# Patient Record
Sex: MALE | Race: AMERICAN INDIAN OR ALASKA NATIVE
[De-identification: names, ages, dates, MRNs, and addresses within clinical notes are randomized per-mention and may not be internally consistent; named-entity substitution may affect disease eponyms.]

---

## 2017-03-14 ENCOUNTER — HOSPITAL ENCOUNTER (EMERGENCY)
Dept: HOSPITAL 43 - DL.ED | Age: 57
LOS: 1 days | Discharge: HOME | End: 2017-03-15
Payer: COMMERCIAL

## 2017-03-14 DIAGNOSIS — R07.81: Primary | ICD-10-CM

## 2017-03-14 PROCEDURE — 71101 X-RAY EXAM UNILAT RIBS/CHEST: CPT

## 2017-03-14 PROCEDURE — 99283 EMERGENCY DEPT VISIT LOW MDM: CPT

## 2017-03-15 VITALS — SYSTOLIC BLOOD PRESSURE: 126 MMHG | DIASTOLIC BLOOD PRESSURE: 83 MMHG

## 2017-03-15 NOTE — EDM.PDOC
ED HPI Trauma





- General


Chief Complaint: Upper Extremity Injury/Pain


Stated Complaint: FELL AND HURT RIBS


Time Seen by Provider: 03/15/17 01:20


Source: Reports: Patient


History Limitations: Reports: No limitations





- History of Present Illness


INITIAL COMMENTS - FREE TEXT/NARRATIVE: 





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 

meals. Has not tried anything for pain, did go to work today, pain worse with 

lifting. No difficulty breathing





Occurred When: other (monday)


Severity: mild


Consciousness: Reports: no loss of consciousness


Associated Symptoms: Reports: no other symptoms


Allergies/ADRs: 


Allergies





No Known Allergies Allergy (Verified 03/14/17 22:42)


 








Home Medications: 


Ambulatory Orders





Phenytoin Sodium Extended [Dilantin] 200 mg PO TID 03/14/17 [Confirmed 03/14/17]











Past Medical History


Neurological History: Reports: Seizure





- Past Surgical History


Musculoskeletal Surgical History: Reports: Other (see below)


Other Musculoskeletal Surgeries/Procedures:: right ankle surgery





Social & Family History





- Family History


Family Medical History: Noncontributory





- Tobacco Use


Smoking Status *Q: Former Smoker


Second Hand Smoke Exposure: No





- Caffeine Use


Caffeine Use: Reports: None





- Recreational Drug Use


Recreational Drug Use: No





Review of Systems





- Review of Systems


Review Of Systems: See Below


Constitutional: Reports: no symptoms


Eyes: Reports: no symptoms


Ears: Reports: no symptoms


Nose: Reports: no symptoms


Mouth/Throat: Reports: no symptoms


Respiratory: Reports: Other (pain left side/ribs, worse with movement).  Denies

: Shortness of Breath, Cough


GI/Abdominal: Reports: No symptoms


Genitourinary: Reports: no symptoms





Trauma Exam





- Physical Exam


Exam: See Below


Exam Limited By: No limitations


General Appearance: Reports: alert, mild distress (increase with movement)


Head: Reports: atraumatic, normocephalic


Eyes: bilateral eye: EOMI


Ears: Reports: normal external exam


Nose: Reports: normal inspection


Throat/Mouth: Reports: Normal inspection


Neck: Reports: non-tender, full range of motion


Respiratory Exam: Reports: no respiratory distress, lungs clear, normal breath 

sounds, splinting, rib tenderness, left (mid lateral), other (no bruising to 

back or ribs).  Denies: rales, rhonchi, wheezing, subcutaneous emphysema


GI/Abdominal: Reports: normal bowel sounds


Back: Reports: full range of motion, normal inspection


Extremities: Reports: no evidence of injury


Neurologic: Reports: alert, normal mood/affect, oriented x 3


Skin: Reports: Normal color, Warm/dry





Course





- Vital Signs


Last Recorded V/S: 


 Last Vital Signs











Temp  98.9 F   03/14/17 22:39


 


Pulse  66   03/15/17 00:56


 


Resp  18   03/15/17 00:56


 


BP  126/83   03/15/17 00:56


 


Pulse Ox  98   03/15/17 00:56














- Orders/Labs/Meds


Orders: 


 Active Orders 24 hr











 Category Date Time Status


 


 Ribs 2V w Chest Rt [CR] Urgent Exams  03/15/17 01:08 Stop Req











Meds: 


Medications














Discontinued Medications














Generic Name Dose Route Start Last Admin





  Trade Name [place]  PRN Reason Stop Dose Admin


 


Ibuprofen  600 mg  03/15/17 01:11  





  Motrin  PO  03/15/17 01:12  





  ONETIME ONE   














Departure





- Departure


Time of Disposition: 01:17


Disposition: Home, Self-Care 01


Condition: fair


Clinical Impression: 


 Rib pain on left side





Instructions:  Rib Contusion


Forms:  ED Department Discharge


Additional Instructions: 


alternate tylenol and ibuprofen for discomfort every 4 hours


splint area with cough


deep breathing every hour while awake


may use heat to area








- My Orders


Last 24 Hours: 


My Active Orders





03/15/17 01:08


Ribs 2V w Chest Rt [CR] Urgent 














- Assessment/Plan


Last 24 Hours: 


My Active Orders





03/15/17 01:08


Ribs 2V w Chest Rt [CR] Urgent

## 2017-03-18 ENCOUNTER — HOSPITAL ENCOUNTER (EMERGENCY)
Dept: HOSPITAL 43 - DL.ED | Age: 57
Discharge: HOME | End: 2017-03-18
Payer: COMMERCIAL

## 2017-03-18 VITALS — DIASTOLIC BLOOD PRESSURE: 80 MMHG | SYSTOLIC BLOOD PRESSURE: 130 MMHG

## 2017-03-18 DIAGNOSIS — Z79.899: ICD-10-CM
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
## 2017-03-18 DIAGNOSIS — S22.42XA: Primary | ICD-10-CM
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
## 2017-03-18 PROCEDURE — 71250 CT THORAX DX C-: CPT

## 2017-03-18 PROCEDURE — 99284 EMERGENCY DEPT VISIT MOD MDM: CPT

## 2017-03-18 NOTE — EDM.PDOC
ED HPI GENERAL MEDICAL PROBLEM





- General


Chief Complaint: General


Stated Complaint: UPPER ABD PAIN, [id number]


Time Seen by Provider: 03/18/17 01:22


Source of Information: Reports: Patient


History Limitations: Reports: No limitations





- History of Present Illness


INITIAL COMMENTS - FREE TEXT/NARRATIVE: 





fell 4 days ago was eval' here with xray.


Treatments PTA: Reports: Acetaminophen


  ** Left Upper Mid-Anterior Chest


Pain Score (Numeric/FACES): 10





- Related Data


 Allergies











Allergy/AdvReac Type Severity Reaction Status Date / Time


 


No Known Allergies Allergy   Verified 03/18/17 01:19











Home Meds: 


 Home Meds





Phenytoin Sodium Extended [Dilantin] 200 mg PO TID 03/14/17 [History]


PHENobarbital [PHENobarbital] 32.4 mg PO TID 03/18/17 [History]











Past Medical History


Neurological History: Reports: Seizure





- Past Surgical History


Musculoskeletal Surgical History: Reports: Other (see below)


Other Musculoskeletal Surgeries/Procedures:: right ankle surgery





Social & Family History





- Family History


Family Medical History: Noncontributory





- Tobacco Use


Smoking Status *Q: Never Smoker


Second Hand Smoke Exposure: No





- Caffeine Use


Caffeine Use: Reports: Soda





- Recreational Drug Use


Recreational Drug Use: No





ED ROS GENERAL





- Review of Systems


Review Of Systems: ROS reveals no pertinent complaints other than HPI.





ED EXAM, GENERAL





- Physical Exam


Exam: See Below


Exam Limited By: No limitations


General Appearance: alert, WD/WN, mild distress, other (pain)


Eye Exam: bilateral eye: PERRL (pupils ess ER @ 4mm)


Ears: hearing grossly normal


Throat/Mouth: Normal voice, No airway compromise


Head: atraumatic


Neck: non-tender, full range of motion


Respiratory/Chest: no respiratory distress, rhonchi, splinting.  No: no 

accessory muscle use, decreased breath sounds, accessory muscle use, retractions


Cardiovascular: regular rate, rhythm


GI/Abdominal: soft, non tender


Neurological: alert, oriented, normal cognition, normal gait, no motor/sensory 

deficits


Psychiatric: flat affect


Skin Exam: Warm, Dry


Lymphatic: no adenopathy





Course





- Vital Signs


Last Recorded V/S: 


 Last Vital Signs











Temp  37.6 C   03/18/17 01:05


 


Pulse  72   03/18/17 01:05


 


Resp  18   03/18/17 01:05


 


BP  130/75   03/18/17 01:05


 


Pulse Ox  92 L  03/18/17 01:05














- Orders/Labs/Meds


Orders: 


 Active Orders 24 hr











 Category Date Time Status


 


 Chest wo Cont [CT] Urgent Exams  03/18/17 01:18 Taken











Meds: 


Medications














Discontinued Medications














Generic Name Dose Route Start Last Admin





  Trade Name Sanchez  PRN Reason Stop Dose Admin


 


Acetaminophen/Hydrocodone Bitart  1 tab  03/18/17 01:23  03/18/17 01:28





  Norco 325-10 Mg  PO  03/18/17 01:24  1 tab





  ONETIME ONE   Administration














- Re-Assessments/Exams


Free Text/Narrative Re-Assessment/Exam: 





03/18/17 03:27


results discussed with pt.





Departure





- Departure


Time of Disposition: 03:27


Disposition: Home, Self-Care 01


Condition: good


Clinical Impression: 


Rib fractures


Qualifiers:


 Encounter type: initial encounter Rib fracture type: multiple ribs Fracture 

type: closed Laterality: left Qualified Code(s): S22.42XA - Multiple fractures 

of ribs, left side, initial encounter for closed fracture





Instructions:  Rib Fracture, Easy-to-Read


Forms:  ED Department Discharge


Additional Instructions: 


1) no bending lifting strain for 2 weeks


2) follow up at clinic or recheck as needed





rx given:


vicodin 5/325mg tid x 12





- My Orders


Last 24 Hours: 


My Active Orders





03/18/17 01:18


Chest wo Cont [CT] Urgent 














- Assessment/Plan


Last 24 Hours: 


My Active Orders





03/18/17 01:18


Chest wo Cont [CT] Urgent

## 2017-03-20 ENCOUNTER — HOSPITAL ENCOUNTER (EMERGENCY)
Dept: HOSPITAL 43 - DL.ED | Age: 57
LOS: 1 days | Discharge: HOME | End: 2017-03-21
Payer: COMMERCIAL

## 2017-03-20 VITALS — SYSTOLIC BLOOD PRESSURE: 122 MMHG | DIASTOLIC BLOOD PRESSURE: 72 MMHG

## 2017-03-20 DIAGNOSIS — K59.01: ICD-10-CM

## 2017-03-20 DIAGNOSIS — Z79.899: ICD-10-CM

## 2017-03-20 DIAGNOSIS — R10.13: Primary | ICD-10-CM

## 2017-03-20 PROCEDURE — 74000: CPT

## 2017-03-20 PROCEDURE — 96375 TX/PRO/DX INJ NEW DRUG ADDON: CPT

## 2017-03-20 PROCEDURE — 99284 EMERGENCY DEPT VISIT MOD MDM: CPT

## 2017-03-20 PROCEDURE — 71010: CPT

## 2017-03-20 PROCEDURE — 93005 ELECTROCARDIOGRAM TRACING: CPT

## 2017-03-20 PROCEDURE — 74177 CT ABD & PELVIS W/CONTRAST: CPT

## 2017-03-20 PROCEDURE — 96374 THER/PROPH/DIAG INJ IV PUSH: CPT

## 2017-03-21 NOTE — EDM.PDOC
ED HPI GI/ABDOMINAL





- General


Chief Complaint: Abdominal Pain


Stated Complaint: STOMACH


Time Seen by Provider: 03/20/17 22:00


Source of Information: Reports: Patient


History Limitations: Reports: No limitations





- History of Present Illness


INITIAL COMMENTS - FREE TEXT/NARRATIVE: 





c/o severe upper abdominal pain after eating doritos tonight. No vomiting. 

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  Was 

evaluated in ED last flash with c/o chest pain. CT done which was unremarkable 

than rib fractures.  Denies problem with constipation. Last BM today. No 

vomiting. 


Timing/Duration: Reports: Hour(s):





- Related Data


Allergies/ADRs: 


 Allergies











Allergy/AdvReac Type Severity Reaction Status Date / Time


 


No Known Allergies Allergy   Verified 03/20/17 22:02











Home Meds: 


 Home Meds





Phenytoin Sodium Extended [Dilantin] 200 mg PO TID 03/14/17 [History]


PHENobarbital [PHENobarbital] 32.4 mg PO TID 03/18/17 [History]


Hydrocodone/Acetaminophen [Vicodin 5-300 mg Tablet] 1 each PO TID PRN 03/20/17 [

History]











Past Medical History


Neurological History: Reports: Seizure





- Past Surgical History


Musculoskeletal Surgical History: Reports: Other (see below)


Other Musculoskeletal Surgeries/Procedures:: right ankle surgery





Social & Family History





- Family History


Family Medical History: Noncontributory





- Tobacco Use


Smoking Status *Q: Unknown Ever Smoked


Second Hand Smoke Exposure: Yes





- Caffeine Use


Caffeine Use: Reports: None





- Recreational Drug Use


Recreational Drug Use: No





ED ROS GENERAL





- Review of Systems


Review Of Systems: See Below


Constitutional: Reports: no symptoms


HEENT: Reports: No symptoms


Respiratory: Reports: Pleuritic Chest Pain


Cardiovascular: Reports: No symptoms


GI/Abdominal: Reports: Abdominal pain, Decreased appetite


: Reports: no symptoms


Musculoskeletal: Reports: no symptoms


Skin: Reports: no symptoms


Neurological: Reports: No Symptoms





ED EXAM, GI/ABD





- Physical Exam


Exam: See Below


Exam Limited By: No limitations


General Appearance: alert, moderate distress


Eyes: bilateral: EOMI


Ears: normal external exam


Nose: normal inspection


Throat/Mouth: Normal inspection, Perioral cyanosis


Head: atraumatic, normocephalic


Neck: normal inspection


Respiratory/Chest: no respiratory distress, lungs clear, other (left lateral 

rib pain with movment)


Cardiovascular: normal peripheral pulses, regular rate, rhythm


GI/Abdominal: hypoactive bowel sounds, tenderness (upper abdomen, mild 

distension pain with palpation).  No: tympanic bowel sounds, guarding


Back Exam: normal inspection


Extremities: normal inspection, normal range of motion


Neurological: alert, oriented, normal cognition


Psychiatric: normal affect, normal mood


Skin Exam: Warm, Dry, Intact, Normal color





Course





- Vital Signs


Last Recorded V/S: 


 Last Vital Signs











Temp  98.2 F   03/20/17 21:50


 


Pulse  66   03/20/17 23:10


 


Resp  20   03/20/17 23:10


 


BP  122/72   03/20/17 23:10


 


Pulse Ox  97   03/20/17 23:10














- Orders/Labs/Meds


Orders: 


 Active Orders 24 hr











 Category Date Time Status


 


 EKG 12 Lead [EKG Documentation Completion] [RC] URGENT Care  03/20/17 22:47 

Active


 


 Abdomen Pelvis w Cont [CT] Urgent Exams  03/21/17 00:20 Taken











Meds: 


Medications














Discontinued Medications














Generic Name Dose Route Start Last Admin





  Trade Name Sanchez  PRN Reason Stop Dose Admin


 


Iopamidol  100 ml  03/21/17 00:19  03/21/17 01:10





  Isovue-300 (61%)  IVPUSH  03/21/17 00:20  100 ml





  ONETIME ONE   Administration


 


Morphine Sulfate  4 mg  03/20/17 22:45  03/20/17 23:04





  Morphine  IVPUSH  03/20/17 22:46  4 mg





  ONETIME ONE   Administration


 


Ondansetron HCl  4 mg  03/20/17 22:42  03/20/17 23:01





  Zofran  IV  03/20/17 22:43  4 mg





  ONETIME ONE   Administration














- Radiology Interpretation


Free Text/Narrative:: 


Upright abdomen, concern for small bowel obstruction, CT abdomen negative, 

Moderate amount of stool throughout abdomen





Departure





- Departure


Time of Disposition: 02:00


Disposition: Home, Self-Care 01


Condition: fair


Clinical Impression: 


Abdominal pain


Qualifiers:


 Abdominal location: epigastric Qualified Code(s): R10.13 - Epigastric pain





Constipation


Qualifiers:


 Constipation type: slow transit constipation Qualified Code(s): K59.01 - Slow 

transit constipation





Instructions:  Constipation, Adult, Easy-to-Read


Referrals: 


PCP,None [Primary Care Provider] - 


Forms:  ED Department Discharge


Additional Instructions: 


miralax one capful daily


avoid use of narcotic pain medication use tylenol or ibuprofen


increase activity


increase fluid intake


no solid food x 12  hours gradual increase diet as tolerated small amounts 

until  having regular stools


follow up as needed











- My Orders


Last 24 Hours: 


My Active Orders





03/20/17 22:47


EKG 12 Lead [EKG Documentation Completion] [RC] URGENT 





03/21/17 00:20


Abdomen Pelvis w Cont [CT] Urgent 














- Assessment/Plan


Last 24 Hours: 


My Active Orders





03/20/17 22:47


EKG 12 Lead [EKG Documentation Completion] [RC] URGENT 





03/21/17 00:20


Abdomen Pelvis w Cont [CT] Urgent

## 2017-04-26 NOTE — EKG
03/20/2017- MIGUEL LORENZO -

 

EKG done on a 56-year-old male showing sinus rhythm with heart rate of 62 beats

per minute.  Normal intervals.  Normal axis.  No acute ST-T wave changes.

 

DD:  04/25/2017 20:29:43

DT:  04/25/2017 22:43:46

[facility]

Job #:  [id number]

## 2018-07-15 ENCOUNTER — HOSPITAL ENCOUNTER (EMERGENCY)
Dept: HOSPITAL 43 - DL.ED | Age: 58
Discharge: HOME | End: 2018-07-15
Payer: COMMERCIAL

## 2018-07-15 VITALS — SYSTOLIC BLOOD PRESSURE: 140 MMHG | DIASTOLIC BLOOD PRESSURE: 68 MMHG

## 2018-07-15 DIAGNOSIS — K21.0: Primary | ICD-10-CM

## 2018-07-15 DIAGNOSIS — F17.210: ICD-10-CM

## 2018-07-15 LAB
ANION GAP SERPL CALC-SCNC: 12 MMOL/L
CHLORIDE SERPL-SCNC: 102 MMOL/L (ref 101–111)
SODIUM SERPL-SCNC: 136 MMOL/L (ref 135–145)

## 2018-07-15 PROCEDURE — 96374 THER/PROPH/DIAG INJ IV PUSH: CPT

## 2018-07-15 PROCEDURE — 80053 COMPREHEN METABOLIC PANEL: CPT

## 2018-07-15 PROCEDURE — 83690 ASSAY OF LIPASE: CPT

## 2018-07-15 PROCEDURE — C9113 INJ PANTOPRAZOLE SODIUM, VIA: HCPCS

## 2018-07-15 PROCEDURE — 82150 ASSAY OF AMYLASE: CPT

## 2018-07-15 PROCEDURE — 85025 COMPLETE CBC W/AUTO DIFF WBC: CPT

## 2018-07-15 PROCEDURE — 36415 COLL VENOUS BLD VENIPUNCTURE: CPT

## 2018-07-15 PROCEDURE — 99284 EMERGENCY DEPT VISIT MOD MDM: CPT

## 2018-07-15 PROCEDURE — G0480 DRUG TEST DEF 1-7 CLASSES: HCPCS

## 2018-07-15 PROCEDURE — 83605 ASSAY OF LACTIC ACID: CPT

## 2018-07-15 PROCEDURE — 82140 ASSAY OF AMMONIA: CPT

## 2018-07-15 NOTE — EDM.PDOC
ED HPI GENERAL MEDICAL PROBLEM





- General


Chief Complaint: Abdominal Pain


Stated Complaint: AMBULANCE-BELLY PAIN


Time Seen by Provider: 07/15/18 22:44


Source of Information: Reports: Patient


History Limitations: Reports: No Limitations





- History of Present Illness


INITIAL COMMENTS - FREE TEXT/NARRATIVE: 





onset epiG pain after eating ham and bologne sandwich. pain goes to the back 

almost.


  ** Middle Abdomen


Pain Score (Numeric/FACES): 7





- Related Data


 Allergies











Allergy/AdvReac Type Severity Reaction Status Date / Time


 


No Known Allergies Allergy   Verified 03/20/17 22:02











Home Meds: 


 Home Meds





Phenytoin Sodium Extended [Dilantin] 200 mg PO TID 03/14/17 [History]


PHENobarbital 32.4 mg PO TID 03/18/17 [History]


Hydrocodone/Acetaminophen [Vicodin 5-300 mg Tablet] 1 each PO TID PRN 03/20/17 [

History]











Past Medical History


Neurological History: Reports: Seizure





- Past Surgical History


Musculoskeletal Surgical History: Reports: Other (See Below)





Social & Family History





- Family History


Family Medical History: Noncontributory





- Tobacco Use


Smoking Status *Q: Former Smoker


Used Tobacco, but Quit: Yes


Month/Year Tobacco Last Used: 01/2008





- Caffeine Use


Caffeine Use: Reports: None





- Recreational Drug Use


Recreational Drug Use: No





ED ROS GENERAL





- Review of Systems


Review Of Systems: ROS reveals no pertinent complaints other than HPI.





ED EXAM, GI/ABD





- Physical Exam


Exam: See Below


Exam Limited By: No Limitations


General Appearance: Alert, WD/WN, Mild Distress, Other (discomfort)


Ears: Hearing Grossly Normal


Throat/Mouth: Normal Voice, No Airway Compromise


Head: Atraumatic


Neck: Non-Tender, Full Range of Motion


Respiratory/Chest: No Respiratory Distress


Cardiovascular: Regular Rate, Rhythm


GI/Abdominal Exam: Soft, Tender, Other (epiG>, BS hyper).  No: Distended, 

Guarding, Rigid, Rebound


Neurological: Alert, Oriented, Normal Cognition, Normal Gait, No Motor/Sensory 

Deficits


Psychiatric: Flat Affect


Skin Exam: Warm, Dry, Normal Color


Lymphatic: No Adenopathy





Course





- Vital Signs


Last Recorded V/S: 





 Last Vital Signs











Temp  36.2 C   07/15/18 20:22


 


Pulse  74   07/15/18 20:22


 


Resp  16   07/15/18 20:22


 


BP  140/68   07/15/18 20:22


 


Pulse Ox  90 L  07/15/18 20:22














- Orders/Labs/Meds


Labs: 





 Laboratory Tests











  07/15/18 07/15/18 07/15/18 Range/Units





  20:06 20:06 20:06 


 


WBC  12.5 H    (5.0-10.0)  10^3/uL


 


RBC  4.76    (4.6-6.2)  10^6/uL


 


Hgb  14.5    (14.0-18.0)  g/dL


 


Hct  43.9    (40.0-54.0)  %


 


MCV  92.2    ()  fL


 


MCH  30.5    (27.0-34.0)  pg


 


MCHC  33.0    (33.0-35.0)  g/dL


 


Plt Count  263    (150-450)  10^3/uL


 


Neut % (Auto)  80.4 H    (42.2-75.2)  %


 


Lymph % (Auto)  11.7 L    (20.5-50.1)  %


 


Mono % (Auto)  5.8    (2-8)  %


 


Eos % (Auto)  1.9    (1.0-3.0)  %


 


Baso % (Auto)  0.2    (0.0-1.0)  %


 


Sodium   136   (135-145)  mmol/L


 


Potassium   4.0   (3.6-5.0)  mmol/L


 


Chloride   102   (101-111)  mmol/L


 


Carbon Dioxide   26.0   (21.0-31.0)  mmol/L


 


Anion Gap   12.0   


 


BUN   21 H   (7-18)  mg/dL


 


Creatinine   0.7   (0.6-1.3)  mg/dL


 


Est Cr Clr Drug Dosing   110.17   mL/min


 


Estimated GFR (MDRD)   > 60   


 


BUN/Creatinine Ratio   30.00   


 


Glucose   117 H   ()  mg/dL


 


Lactic Acid     (0.5-2.2)  mmol/L


 


Calcium   8.8   (8.4-10.2)  mg/dl


 


Total Bilirubin   0.2   (0.2-1.0)  mg/dL


 


AST   26   (10-42)  IU/L


 


ALT   28   (10-60)  IU/L


 


Alkaline Phosphatase   103   ()  IU/L


 


Ammonia    4 L  (11-35)  umol/L


 


Total Protein   7.6   (6.7-8.2)  g/dl


 


Albumin   4.0   (3.2-5.5)  g/dl


 


Globulin   3.6   


 


Albumin/Globulin Ratio   1.11   


 


Amylase   59   ()  U/L


 


Lipase   25   (22-51)  U/L


 


Ethyl Alcohol     mg/dL














  07/15/18 07/15/18 Range/Units





  20:06 20:06 


 


WBC    (5.0-10.0)  10^3/uL


 


RBC    (4.6-6.2)  10^6/uL


 


Hgb    (14.0-18.0)  g/dL


 


Hct    (40.0-54.0)  %


 


MCV    ()  fL


 


MCH    (27.0-34.0)  pg


 


MCHC    (33.0-35.0)  g/dL


 


Plt Count    (150-450)  10^3/uL


 


Neut % (Auto)    (42.2-75.2)  %


 


Lymph % (Auto)    (20.5-50.1)  %


 


Mono % (Auto)    (2-8)  %


 


Eos % (Auto)    (1.0-3.0)  %


 


Baso % (Auto)    (0.0-1.0)  %


 


Sodium    (135-145)  mmol/L


 


Potassium    (3.6-5.0)  mmol/L


 


Chloride    (101-111)  mmol/L


 


Carbon Dioxide    (21.0-31.0)  mmol/L


 


Anion Gap    


 


BUN    (7-18)  mg/dL


 


Creatinine    (0.6-1.3)  mg/dL


 


Est Cr Clr Drug Dosing    mL/min


 


Estimated GFR (MDRD)    


 


BUN/Creatinine Ratio    


 


Glucose    ()  mg/dL


 


Lactic Acid  1.3   (0.5-2.2)  mmol/L


 


Calcium    (8.4-10.2)  mg/dl


 


Total Bilirubin    (0.2-1.0)  mg/dL


 


AST    (10-42)  IU/L


 


ALT    (10-60)  IU/L


 


Alkaline Phosphatase    ()  IU/L


 


Ammonia    (11-35)  umol/L


 


Total Protein    (6.7-8.2)  g/dl


 


Albumin    (3.2-5.5)  g/dl


 


Globulin    


 


Albumin/Globulin Ratio    


 


Amylase    ()  U/L


 


Lipase    (22-51)  U/L


 


Ethyl Alcohol   < 5  mg/dL











Meds: 





Medications














Discontinued Medications














Generic Name Dose Route Start Last Admin





  Trade Name Freq  PRN Reason Stop Dose Admin


 


Hydrocodone Bitart/Acetaminophen  1 tab  07/15/18 22:00  07/15/18 22:13





  Norco 325-10 Mg  PO  07/15/18 22:01  1 tab





  ONETIME ONE   Administration





     





     





     





     


 


Al Hydroxide/Mg Hydroxide  30 ml  07/15/18 21:19  07/15/18 21:29





  Gi Cocktail  PO  07/15/18 21:20  30 ml





  ONETIME ONE   Administration





     





     





     





     


 


Pantoprazole Sodium  40 mg  07/15/18 21:59  07/15/18 22:13





  Protonix Iv***  IVPUSH  07/15/18 22:00  40 mg





  ONETIME ONE   Administration





     





     





     





     














- Re-Assessments/Exams


Free Text/Narrative Re-Assessment/Exam: 





07/15/18 22:46


results discussed with pt. 


re-exam; s/p IV & GI cocktail & Rx = much better.





Departure





- Departure


Time of Disposition: 22:46


Disposition: Home, Self-Care 01


Condition: Good


Clinical Impression: 


Abdominal pain


Qualifiers:


 Abdominal location: epigastric Qualified Code(s): R10.13 - Epigastric pain





GERD (gastroesophageal reflux disease)


Qualifiers:


 Esophagitis presence: with esophagitis Qualified Code(s): K21.0 - Gastro-

esophageal reflux disease with esophagitis








- Discharge Information


Instructions:  Food Choices for Gastroesophageal Reflux Disease, Adult


Additional Instructions: 


1) see clinic tomorrow for GALL BLADDER ULTRASOUND and H-PYLORI TITER


2) recheck as needed





rx given;


pepcid 20mg daily x 7

## 2018-09-19 ENCOUNTER — HOSPITAL ENCOUNTER (EMERGENCY)
Dept: HOSPITAL 43 - DL.ED | Age: 58
Discharge: HOME | End: 2018-09-19
Payer: COMMERCIAL

## 2018-09-19 VITALS — DIASTOLIC BLOOD PRESSURE: 79 MMHG | SYSTOLIC BLOOD PRESSURE: 132 MMHG

## 2018-09-19 DIAGNOSIS — Z87.891: ICD-10-CM

## 2018-09-19 DIAGNOSIS — K21.0: Primary | ICD-10-CM

## 2018-09-19 LAB
ANION GAP SERPL CALC-SCNC: 12.7 MMOL/L
CHLORIDE SERPL-SCNC: 104 MMOL/L (ref 101–111)
SODIUM SERPL-SCNC: 138 MMOL/L (ref 135–145)

## 2018-09-19 NOTE — EDM.PDOC
ED HPI GENERAL MEDICAL PROBLEM





- General


Chief Complaint: Chest Pain


Stated Complaint: CHEST PAIN [id number]


Time Seen by Provider: 09/19/18 18:00


Source of Information: Reports: Patient, RN, RN Notes Reviewed


History Limitations: Reports: No Limitations





- History of Present Illness


INITIAL COMMENTS - FREE TEXT/NARRATIVE: 


Pt to the ER with c/o a sudden onset of epigastric/chest pain. Patient states 

he had eaten nachos prior to the sharp pain. Patient states he had a lap tonja 

a month ago. Patient denies radiation of the pain into the arms, jaw. No N/V/D, 

fever, chills, SOB. 





Onset: Today, Sudden


Duration: Improving


Location: Reports: Chest


Quality: Reports: Sharp


Severity: Moderate


Improves with: Reports: None


Worsens with: Reports: None


Associated Symptoms: Reports: Chest Pain





- Related Data


 Allergies











Allergy/AdvReac Type Severity Reaction Status Date / Time


 


No Known Allergies Allergy   Verified 09/19/18 17:46











Home Meds: 


 Home Meds





Phenytoin Sodium Extended [Dilantin] 200 mg PO TID 03/14/17 [History]


PHENobarbital 32.4 mg PO TID 03/18/17 [History]











Past Medical History


Neurological History: Reports: Seizure





- Past Surgical History


GI Surgical History: Reports: Cholecystectomy





Social & Family History





- Family History


Family Medical History: Noncontributory





- Tobacco Use


Smoking Status *Q: Former Smoker


Used Tobacco, but Quit: Yes


Month/Year Tobacco Last Used: 2008





- Caffeine Use


Caffeine Use: Reports: None





ED ROS GENERAL





- Review of Systems


Review Of Systems: ROS reveals no pertinent complaints other than HPI.





ED EXAM, GENERAL





- Physical Exam


Exam: See Below


Exam Limited By: No Limitations


General Appearance: Alert, WD/WN, Mild Distress


Eye Exam: Bilateral Eye: EOMI, Normal Inspection


Ears: Normal External Exam, Hearing Grossly Normal


Nose: Normal Inspection


Throat/Mouth: Normal Inspection, Normal Voice, No Airway Compromise


Head: Atraumatic, Normocephalic


Neck: Normal Inspection


Respiratory/Chest: No Respiratory Distress, Lungs Clear, Normal Breath Sounds, 

No Accessory Muscle Use, Chest Non-Tender


Cardiovascular: Normal Peripheral Pulses, Regular Rate, Rhythm, No Edema, No 

Gallop, No JVD, No Murmur, No Rub


Peripheral Pulses: 2+: Radial (L), Radial (R)


GI/Abdominal: Normal Bowel Sounds, Soft, Non-Tender, Distended (upper abdomen)


 (Male) Exam: Deferred


Rectal (Males) Exam: Deferred


Back Exam: Normal Inspection, Full Range of Motion


Extremities: Normal Inspection, Normal Range of Motion, Non-Tender, No Pedal 

Edema, Normal Capillary Refill


Neurological: Alert, Oriented, Normal Cognition, Normal Gait, Normal Reflexes, 

No Motor/Sensory Deficits


Psychiatric: Anxious


Skin Exam: Warm, Dry, Intact, Normal Color, No Rash


Lymphatic: No Adenopathy





EKG INTERPRETATION


EKG Date: 09/19/18


Time: 17:44


Rhythm: NSR


Rate (Beats/Min): 77


Axis: Normal


P-Wave: Present


QRS: Normal


ST-T: Normal


QT: Normal


Comparison: No Change





Course





- Vital Signs


Last Recorded V/S: 


 Last Vital Signs











Temp  97.7 F   09/19/18 17:36


 


Pulse  78   09/19/18 17:36


 


Resp  18   09/19/18 17:36


 


BP  132/79   09/19/18 17:36


 


Pulse Ox  96   09/19/18 17:36














- Orders/Labs/Meds


Orders: 


 Active Orders 24 hr











 Category Date Time Status


 


 EKG Documentation Completion [RC] STAT Care  09/19/18 18:04 Active


 


 Peripheral IV Care [RC] .AS DIRECTED Care  09/19/18 18:05 Active


 


 Chest 1V Frontal [CR] Stat Exams  09/19/18 18:04 Taken


 


 UA W/MICROSCOPIC [URIN] Stat Lab  09/19/18 18:04 Ordered


 


 Sodium Chloride 0.9% [Saline Flush] Med  09/19/18 18:04 Active





 10 ml FLUSH ASDIRECTED PRN   


 


 Peripheral IV Insertion Adult [OM.PC] Stat Oth  09/19/18 18:04 Ordered








 Medication Orders





Sodium Chloride (Saline Flush)  10 ml FLUSH ASDIRECTED PRN


   PRN Reason: Keep Vein Open


   Last Admin: 09/19/18 17:45  Dose: 10 ml








Labs: 


 Laboratory Tests











  09/19/18 09/19/18 Range/Units





  17:55 17:55 


 


WBC  10.5 H   (5.0-10.0)  10^3/uL


 


RBC  4.57 L   (4.6-6.2)  10^6/uL


 


Hgb  13.8 L   (14.0-18.0)  g/dL


 


Hct  41.7   (40.0-54.0)  %


 


MCV  91.2   ()  fL


 


MCH  30.2   (27.0-34.0)  pg


 


MCHC  33.1   (33.0-35.0)  g/dL


 


Plt Count  226   (150-450)  10^3/uL


 


Neut % (Auto)  58.6   (42.2-75.2)  %


 


Lymph % (Auto)  26.3   (20.5-50.1)  %


 


Mono % (Auto)  10.3 H   (2-8)  %


 


Eos % (Auto)  4.4 H   (1.0-3.0)  %


 


Baso % (Auto)  0.4   (0.0-1.0)  %


 


Sodium   138  (135-145)  mmol/L


 


Potassium   3.7  (3.6-5.0)  mmol/L


 


Chloride   104  (101-111)  mmol/L


 


Carbon Dioxide   25.0  (21.0-31.0)  mmol/L


 


Anion Gap   12.7  


 


BUN   19 H  (7-18)  mg/dL


 


Creatinine   0.9  (0.6-1.3)  mg/dL


 


Est Cr Clr Drug Dosing   85.69  mL/min


 


Estimated GFR (MDRD)   > 60  


 


BUN/Creatinine Ratio   21.11  


 


Glucose   88  ()  mg/dL


 


Calcium   8.6  (8.4-10.2)  mg/dl


 


Total Bilirubin   0.4  (0.2-1.0)  mg/dL


 


AST   71 H  (10-42)  IU/L


 


ALT   34  (10-60)  IU/L


 


Alkaline Phosphatase   130 H  ()  IU/L


 


Troponin I   < 0.02  (0.00-0.02)  ng/ml


 


Total Protein   7.6  (6.7-8.2)  g/dl


 


Albumin   4.0  (3.2-5.5)  g/dl


 


Globulin   3.6  


 


Albumin/Globulin Ratio   1.11  











Meds: 


Medications











Generic Name Dose Route Start Last Admin





  Trade Name Freq  PRN Reason Stop Dose Admin


 


Sodium Chloride  10 ml  09/19/18 18:04  09/19/18 17:45





  Saline Flush  FLUSH   10 ml





  ASDIRECTED PRN   Administration





  Keep Vein Open   





     





     





     














Discontinued Medications














Generic Name Dose Route Start Last Admin





  Trade Name Freq  PRN Reason Stop Dose Admin


 


Al Hydroxide/Mg Hydroxide  30 ml  09/19/18 18:26  09/19/18 18:31





  Gi Cocktail  PO  09/19/18 18:27  30 ml





  ONETIME ONE   Administration





     





     





     





     


 


Aspirin  324 mg  09/19/18 18:10  09/19/18 18:29





  Aspirin  PO  09/19/18 18:11  324 mg





  ONETIME ONE   Administration





     





     





     





     














- Radiology Interpretation


Free Text/Narrative:: 


Chest xray:


IMPRESSION:


1. Heart top normal in size with left ventricular prominence.


2. Stable parenchymal scarring in the left lower lobe.


3. No sign of acute cardiopulmonary abnormality.


See rad report








Departure





- Departure


Time of Disposition: 18:53


Disposition: Home, Self-Care 01


Condition: Fair


Clinical Impression: 


GERD (gastroesophageal reflux disease)


Qualifiers:


 Esophagitis presence: with esophagitis Qualified Code(s): K21.0 - Gastro-

esophageal reflux disease with esophagitis





Instructions:  Indigestion, Easy-to-Read, Food Choices for Gastroesophageal 

Reflux Disease, Adult, Easy-to-Read, Heartburn, Easy-to-Read, Food Choices for 

Gastroesophageal Reflux Disease, Adult


Forms:  ED Department Discharge


Additional Instructions: 


Return to the ER with any further problems


Follow up with your primary care facility








- My Orders


Last 24 Hours: 


My Active Orders





09/19/18 18:04


EKG Documentation Completion [RC] STAT 


Chest 1V Frontal [CR] Stat 


UA W/MICROSCOPIC [URIN] Stat 


Sodium Chloride 0.9% [Saline Flush]   10 ml FLUSH ASDIRECTED PRN 


Peripheral IV Insertion Adult [OM.PC] Stat 





09/19/18 18:05


Peripheral IV Care [RC] .AS DIRECTED 














- Assessment/Plan


Last 24 Hours: 


My Active Orders





09/19/18 18:04


EKG Documentation Completion [RC] STAT 


Chest 1V Frontal [CR] Stat 


UA W/MICROSCOPIC [URIN] Stat 


Sodium Chloride 0.9% [Saline Flush]   10 ml FLUSH ASDIRECTED PRN 


Peripheral IV Insertion Adult [OM.PC] Stat 





09/19/18 18:05


Peripheral IV Care [RC] .AS DIRECTED

## 2018-09-30 ENCOUNTER — HOSPITAL ENCOUNTER (EMERGENCY)
Dept: HOSPITAL 43 - DL.ED | Age: 58
Discharge: NURSING, REHAB OR LONG-TERM CARE | End: 2018-09-30
Payer: COMMERCIAL

## 2018-09-30 VITALS — DIASTOLIC BLOOD PRESSURE: 74 MMHG | SYSTOLIC BLOOD PRESSURE: 119 MMHG

## 2018-09-30 DIAGNOSIS — R79.89: ICD-10-CM

## 2018-09-30 DIAGNOSIS — K91.5: Primary | ICD-10-CM

## 2018-09-30 DIAGNOSIS — Z79.899: ICD-10-CM

## 2018-09-30 LAB
ANION GAP SERPL CALC-SCNC: 11.5 MMOL/L
CHLORIDE SERPL-SCNC: 100 MMOL/L (ref 101–111)
SODIUM SERPL-SCNC: 135 MMOL/L (ref 135–145)

## 2018-09-30 PROCEDURE — 96376 TX/PRO/DX INJ SAME DRUG ADON: CPT

## 2018-09-30 PROCEDURE — C9113 INJ PANTOPRAZOLE SODIUM, VIA: HCPCS

## 2018-09-30 PROCEDURE — 99285 EMERGENCY DEPT VISIT HI MDM: CPT

## 2018-09-30 PROCEDURE — 96375 TX/PRO/DX INJ NEW DRUG ADDON: CPT

## 2018-09-30 PROCEDURE — 82150 ASSAY OF AMYLASE: CPT

## 2018-09-30 PROCEDURE — 36415 COLL VENOUS BLD VENIPUNCTURE: CPT

## 2018-09-30 PROCEDURE — 83690 ASSAY OF LIPASE: CPT

## 2018-09-30 PROCEDURE — 74021 RADEX ABDOMEN 3+ VIEWS: CPT

## 2018-09-30 PROCEDURE — 87040 BLOOD CULTURE FOR BACTERIA: CPT

## 2018-09-30 PROCEDURE — 74177 CT ABD & PELVIS W/CONTRAST: CPT

## 2018-09-30 PROCEDURE — 85025 COMPLETE CBC W/AUTO DIFF WBC: CPT

## 2018-09-30 PROCEDURE — 96368 THER/DIAG CONCURRENT INF: CPT

## 2018-09-30 PROCEDURE — 84484 ASSAY OF TROPONIN QUANT: CPT

## 2018-09-30 PROCEDURE — 80053 COMPREHEN METABOLIC PANEL: CPT

## 2018-09-30 PROCEDURE — 96366 THER/PROPH/DIAG IV INF ADDON: CPT

## 2018-09-30 PROCEDURE — 96365 THER/PROPH/DIAG IV INF INIT: CPT

## 2018-09-30 NOTE — EDM.PDOC
ED HPI GENERAL MEDICAL PROBLEM





- General


Chief Complaint: Chest Pain


Stated Complaint: CHEST PAIN [id number]


Time Seen by Provider: 09/30/18 19:03


Source of Information: Reports: Patient


History Limitations: Reports: No Limitations





- History of Present Illness


INITIAL COMMENTS - FREE TEXT/NARRATIVE: 





STATES ATE MAC&CHEESE+BURGER+CHIPS+POP+ICE CREAM AND NOW STOMACH HURTS ALOT. 

HAD GB REMOVED ALREADY. DENIES CP/SOB.


  ** Upper Abdominal


Pain Score (Numeric/FACES): 9





- Related Data


 Allergies











Allergy/AdvReac Type Severity Reaction Status Date / Time


 


No Known Allergies Allergy   Verified 09/19/18 17:46











Home Meds: 


 Home Meds





Phenytoin Sodium Extended [Dilantin] 200 mg PO TID 03/14/17 [History]


PHENobarbital 32.4 mg PO TID 03/18/17 [History]











Past Medical History


Neurological History: Reports: Seizure





- Past Surgical History


GI Surgical History: Reports: Cholecystectomy


Musculoskeletal Surgical History: Reports: Other (See Below)





Social & Family History





- Family History


Family Medical History: Noncontributory





- Tobacco Use


Smoking Status *Q: Never Smoker





- Caffeine Use


Caffeine Use: Reports: Soda





- Recreational Drug Use


Recreational Drug Use: No





ED ROS GENERAL





- Review of Systems


Review Of Systems: ROS reveals no pertinent complaints other than HPI.





ED EXAM, GI/ABD





- Physical Exam


Exam: See Below


Exam Limited By: No Limitations


General Appearance: Alert, WD/WN, Mild Distress, Other (epig pain)


Ears: Hearing Grossly Normal


Throat/Mouth: Normal Voice, No Airway Compromise


Head: Atraumatic


Neck: Non-Tender, Full Range of Motion


Respiratory/Chest: No Respiratory Distress


Cardiovascular: Regular Rate, Rhythm


GI/Abdominal Exam: Tender, Abnormal Bowel Sounds, Other (hyper BS).  No: 

Distended, Guarding, Rigid, Rebound


Neurological: Alert, Oriented, Normal Cognition, Normal Gait, No Motor/Sensory 

Deficits


Psychiatric: Tearful


Skin Exam: Warm, Dry, Normal Color


Lymphatic: No Adenopathy





Course





- Vital Signs


Last Recorded V/S: 


 Last Vital Signs











Temp  36.8 C   09/30/18 18:43


 


Pulse  60   09/30/18 18:43


 


Resp  16   09/30/18 18:43


 


BP  119/74   09/30/18 18:43


 


Pulse Ox  100   09/30/18 18:43














- Orders/Labs/Meds


Orders: 


 Active Orders 24 hr











 Category Date Time Status


 


 Abdomen Pelvis w Cont [CT] Urgent Exams  09/30/18 20:30 Taken


 


 CULTURE BLOOD [BC] Stat Lab  09/30/18 21:41 Ordered


 


 Piperacillin/Tazobactam [Zosyn] 3.375 gm Med  09/30/18 21:41 Active





 Sodium Chloride 0.9% [Normal Saline] 100 ml   





 IV ONETIME   


 


 Sodium Chloride 0.9% [Normal Saline] 1,000 ml Med  09/30/18 20:30 Active





 IV ASDIRECTED   








 Medication Orders





Sodium Chloride (Normal Saline)  1,000 mls @ 500 mls/hr IV ASDIRECTED SHARON


   Last Admin: 09/30/18 20:59  Dose: 500 mls/hr


Piperacillin Sod/Tazobactam (Sod 3.375 gm/ Sodium Chloride)  100 mls @ 200 mls/

hr IV ONETIME ONE


   Stop: 09/30/18 22:10








Labs: 


 Laboratory Tests











  09/30/18 09/30/18 Range/Units





  19:13 19:13 


 


WBC  8.6   (5.0-10.0)  10^3/uL


 


RBC  4.87   (4.6-6.2)  10^6/uL


 


Hgb  14.5   (14.0-18.0)  g/dL


 


Hct  44.4   (40.0-54.0)  %


 


MCV  91.2   ()  fL


 


MCH  29.8   (27.0-34.0)  pg


 


MCHC  32.7 L   (33.0-35.0)  g/dL


 


Plt Count  249   (150-450)  10^3/uL


 


Neut % (Auto)  66.1   (42.2-75.2)  %


 


Lymph % (Auto)  20.6   (20.5-50.1)  %


 


Mono % (Auto)  7.8   (2-8)  %


 


Eos % (Auto)  5.0 H   (1.0-3.0)  %


 


Baso % (Auto)  0.5   (0.0-1.0)  %


 


Sodium   135  (135-145)  mmol/L


 


Potassium   4.5  (3.6-5.0)  mmol/L


 


Chloride   100 L  (101-111)  mmol/L


 


Carbon Dioxide   28.0  (21.0-31.0)  mmol/L


 


Anion Gap   11.5  


 


BUN   15  (7-18)  mg/dL


 


Creatinine   0.8  (0.6-1.3)  mg/dL


 


Est Cr Clr Drug Dosing   96.40  mL/min


 


Estimated GFR (MDRD)   > 60  


 


BUN/Creatinine Ratio   18.75  


 


Glucose   120 H  ()  mg/dL


 


Calcium   8.8  (8.4-10.2)  mg/dl


 


Total Bilirubin   1.2 H  (0.2-1.0)  mg/dL


 


AST   342 H  (10-42)  IU/L


 


ALT   308 H  (10-60)  IU/L


 


Alkaline Phosphatase   370 H  ()  IU/L


 


Troponin I   < 0.02  (0.00-0.02)  ng/ml


 


Total Protein   8.1  (6.7-8.2)  g/dl


 


Albumin   4.3  (3.2-5.5)  g/dl


 


Globulin   3.8  


 


Albumin/Globulin Ratio   1.13  


 


Amylase   63  ()  U/L


 


Lipase   25  (22-51)  U/L











Meds: 


Medications











Generic Name Dose Route Start Last Admin





  Trade Name Freq  PRN Reason Stop Dose Admin


 


Sodium Chloride  1,000 mls @ 500 mls/hr  09/30/18 20:30  09/30/18 20:59





  Normal Saline  IV   500 mls/hr





  ASDIRECTED SHARON   Administration





     





     





     





     


 


Piperacillin Sod/Tazobactam  100 mls @ 200 mls/hr  09/30/18 21:41  





  Sod 3.375 gm/ Sodium Chloride  IV  09/30/18 22:10  





  ONETIME ONE   





     





     





     





     














Discontinued Medications














Generic Name Dose Route Start Last Admin





  Trade Name Freq  PRN Reason Stop Dose Admin


 


Al Hydroxide/Mg Hydroxide  30 ml  09/30/18 18:45  09/30/18 18:52





  Gi Cocktail  PO  09/30/18 18:46  30 ml





  ONETIME ONE   Administration





     





     





     





     


 


Hydromorphone HCl  1 mg  09/30/18 20:22  09/30/18 20:30





  Dilaudid  IVPUSH  09/30/18 20:23  1 mg





  ONETIME ONE   Administration





     





     





     





     


 


Iopamidol  100 ml  09/30/18 20:23  09/30/18 20:36





  Isovue-300 (61%)  IVPUSH  09/30/18 20:24  100 ml





  ONETIME ONE   Administration





     





     





     





     


 


Iopamidol  100 ml  09/30/18 20:31  09/30/18 20:37





  Isovue-300 (61%)  IVPUSH  09/30/18 20:32  Not Given





  ONETIME ONE   





     





     





     





     


 


Ondansetron HCl  4 mg  09/30/18 20:22  09/30/18 20:30





  Zofran  IV  09/30/18 20:23  4 mg





  ONETIME ONE   Administration





     





     





     





     


 


Pantoprazole Sodium  40 mg  09/30/18 19:08  09/30/18 19:23





  Protonix Iv***  IVPUSH  09/30/18 19:09  40 mg





  ONETIME ONE   Administration





     





     





     





     














- Re-Assessments/Exams


Free Text/Narrative Re-Assessment/Exam: 





09/30/18 20:23


re-exam; abd more tender with guarding no rebound. s/p GI & IV protoniex with '0

'. pt states had GB removed earlier this month and been ok till today after 

eating.


09/30/18 21:42


case discussed with Dr Zafar @  who kindly accepted pt.





Departure





- Departure


Time of Disposition: 21:43


Disposition: DC/Tfer to [facility] 02


Clinical Impression: 


 Intrahepatic bile duct dilation, Post-cholecystectomy syndrome, LFT elevation








- Discharge Information


Forms:  Interfacility Transfer EMTALA





- My Orders


Last 24 Hours: 


My Active Orders





09/30/18 20:30


Abdomen Pelvis w Cont [CT] Urgent 


Sodium Chloride 0.9% [Normal Saline] 1,000 ml IV ASDIRECTED 





09/30/18 21:41


CULTURE BLOOD [BC] Stat 


Piperacillin/Tazobactam [Zosyn] 3.375 gm   Sodium Chloride 0.9% [Normal Saline] 

100 ml IV ONETIME 














- Assessment/Plan


Last 24 Hours: 


My Active Orders





09/30/18 20:30


Abdomen Pelvis w Cont [CT] Urgent 


Sodium Chloride 0.9% [Normal Saline] 1,000 ml IV ASDIRECTED 





09/30/18 21:41


CULTURE BLOOD [BC] Stat 


Piperacillin/Tazobactam [Zosyn] 3.375 gm   Sodium Chloride 0.9% [Normal Saline] 

100 ml IV ONETIME

## 2020-12-03 NOTE — LETTER
2020

 

 

 

 

RE:  GONZÁLEZ LORENZO

:  1960

 

 

 

 

Bonnie Humphries NP

[facility]

[address]

[place] [address]

 

Dear Ms. Humphries:

 

Mr. González Lorenzo had colonoscopy examination done this morning, and he

tolerated the procedure well.  I herewith send a copy of the endoscopy note and

photographs for your review.

 

Thank you,

 

Sincerely,

 

DD:  2020 07:28:47

DT:  2020 07:52:14

[facility]

Job #:  [id number]

## 2020-12-03 NOTE — OR
DATE:  12/03/2020

 

PROCEDURE:  Total colonoscopy and cold snare polypectomy.

 

INSTRUMENT USED:  CF-NI827X Olympus video colonoscope.

 

PREMEDICATIONS:  Fentanyl 100 mcg intravenous, Versed 4 mg intravenous.  Nasal

O2 cannula.

 

The procedure was done under pulse oximetry, BP recording, and cardiac monitor.

 

INDICATION:  The patient with Hemoccult positive stools.  Colonoscopic

examination is done for detection of polypoid lesions and removal, endoscopic

hemostasis therapy if needed.

 

DESCRIPTION OF PROCEDURE:  Initial rectal exam was unremarkable.  Rigid anoscopy

showed small internal hemorrhoids without bleeding from them.  The colonoscope

was passed with ease up to the ileocecal area.  Photographs were taken of the

normal-appearing cecum, identified by landmarks of appendiceal orifice and

double-bulged ileocecal folds.  No bleeding was noted from any of the visualized

areas at the commencement of the examination.  There was moderate amount of

solid fecal material in the right colon that could not be aspirated clear.  The

bowel preparation was [place] scale #2 an in transverse colon and #1 in the right

colon.  Total score 5.  No stricture.  No vascular ectasia.  No large isolated

ulcerations seen.  No evidence of diffuse inflammatory bowel disease in the form

of friability, contact bleeding, or ulcerations.  In the proximal sigmoid colon,

diminutive benign-appearing polyp was noted, photographs were taken, cold snare

polypectomy was done.  The tissue was retrieved and sent for histopathology.

Probing the proximal sides of folds and flexures using adequate distention and

clearing up the stool material, withdrawal of the scope was made, cecum to

rectum time over 6 minutes.  No bleeding was noted from any of the visualized

areas at the completion of the examination.

 

IMPRESSION:

1. Internal hemorrhoids.

2. Sigmoid colonic polyp.  The patient tolerated the procedure well.

 

DD:  12/03/2020 07:28:47

DT:  12/03/2020 07:50:26

[facility]

Job #:  [id number]

## 2021-02-05 NOTE — OR
DATE:  02/05/2021

 

PROCEDURE:  Total colonoscopy.

 

INSTRUMENT USED:  PCF-H190DL Olympus video colonoscope.

 

PREMEDICATIONS:  Fentanyl 175 mcg intravenous, Versed 4 mg intravenous, nasal O2

cannula.

 

Procedure was done under pulse oximetry, BP recording, and cardiac monitor.

 

INDICATION:  The patient with Hemoccult positive stools and previous inadequate

study due to the presence of large amount of fecal material.  Colonoscopic

examination is done for detection of any polypoid lesions and removal,

endoscopic hemostasis therapy if needed.

 

DESCRIPTION OF PROCEDURE:  Initial rectal exam was unremarkable.  Rigid anoscopy

showed small internal hemorrhoids without bleeding from them.  The colonoscope

was passed with ease up to the ileocecal area.  Photographs were taken of the

normal-appearing cecum, identified by landmarks of appendiceal orifice and

double-bulged ileocecal folds.  No bleeding was noted from any of the visualized

areas at the commencement of the examination.  The bowel preparation was found

to be adequate, [place] scale 2 in all the areas, total score 6.  No stricture.

No vascular ectasia.  No large isolated ulcerations seen.  No evidence of

diffuse inflammatory bowel disease in the form of friability, contact bleeding,

or ulcerations.  No polyp or tumor mass identified.  Probing the proximal sides

of folds and flexures using adequate distention and clearing up the stool

material, withdrawal of the scope was made, cecum to rectum time over 6 minutes.

No bleeding was noted from any of the visualized areas at the completion of

examination.

 

IMPRESSION:  Internal hemorrhoids.

 

The patient tolerated the procedure well.

 

DD:  02/05/2021 08:51:30

DT:  02/05/2021 10:26:02

[facility]

Job #:  [id number]

## 2021-02-05 NOTE — LETTER
2021

 

 

 

 

RE:  GONZÁLEZ LORENZO

:  1960

 

 

 

Bonnie Humphries NP

[facility]

[address]

[place]  [address]

 

Dear Ms. Humphries:

 

Mr. González Lorenzo had colonoscopic examination done this morning, and he

tolerated the procedure well.  I herewith send a copy of the endoscopy note and

photographs for your review.

 

Thank you.

 

Sincerely,

 

DD:  2021 08:51:30

DT:  2021 10:29:13

[facility]

Job #:  [id number]

## 2021-02-22 NOTE — EDM.PDOC
ED HPI GENERAL MEDICAL PROBLEM





- General


Time Seen by Provider: 02/22/21 13:29


Source of Information: Reports: Patient


History Limitations: Reports: No Limitations





- History of Present Illness


INITIAL COMMENTS - FREE TEXT/NARRATIVE: 





This 61 yo male patient was brought to the ED by [facility] due to a ground level fall

and posterior head pain. The patient reports he was walking outside when he 

slipped on the ice falling on the back of his head. The patient reports he does 

not believe he had a loss of consciousness before, during or after the fall, but

had difficulties remembering his address after the fall. The patient does have a

history of seizures with his last seizure being over 10 years ago. The patient 

denies any seizure today. 


Onset: Today


Duration: Minutes:


Location: Reports: Head (Posterior)


Quality: Reports: Ache, Dull


Severity: Moderate


Improves with: Reports: None


Worsens with: Reports: None


Context: Reports: Activity


Associated Symptoms: Reports: No Other Symptoms


  ** headache


Pain Score (Numeric/FACES): 5





- Related Data


                                    Allergies











Allergy/AdvReac Type Severity Reaction Status Date / Time


 


No Known Allergies Allergy   Verified 02/22/21 13:47











Home Meds: 


                                    Home Meds





Phenytoin Sodium Extended [Dilantin] 600 mg PO DAILY 03/14/17 [History]


PHENobarbital 32.4 mg PO TID 03/18/17 [History]


Acetaminophen 500 mg PO DAILY PRN 11/20/20 [History]


Celecoxib [CeleBREX] 100 mg PO DAILY 11/23/20 [History]











Past Medical History


HEENT History: 


Other HEENT History: UPPER AND LOWER DENTURE PLATES


Cardiovascular History: Reports: None


Respiratory History: Reports: None


Gastrointestinal History: Reports: GERD, Hepatitis


Genitourinary History: Reports: Other (See Below)


Other Genitourinary History: ERCP W/BX.  ERCP SPHINCTEROTOMY/PAPILLOTOMY


Musculoskeletal History: Reports: Arthritis, Back Pain, Chronic, Fracture


Neurological History: Reports: Seizure


Psychiatric History: Reports: Addiction


Endocrine/Metabolic History: Reports: Obesity/BMI 30+


Hematologic History: Reports: None


Immunologic History: Reports: None


Oncologic (Cancer) History: Reports: None


Dermatologic History: Reports: None





- Infectious Disease History


Infectious Disease History: Reports: Chicken Pox, Hepatitis C, Measles, Mumps





- Past Surgical History


Head Surgeries/Procedures: Reports: None


HEENT Surgical History: Reports: Oral Surgery


Cardiovascular Surgical History: Reports: None


Respiratory Surgical History: Reports: None


GI Surgical History: Reports: Cholecystectomy, Colonoscopy, ERCP, Other (See 

Below)


Other GI Surgeries/Procedures: LAP PATRICK


Male  Surgical History: Reports: None


Endocrine Surgical History: Reports: None


Neurological Surgical History: Reports: None


Musculoskeletal Surgical History: Reports: ORIF, Other (See Below)


Other Musculoskeletal Surgeries/Procedures:: right ankle surgery


Oncologic Surgical History: Reports: None


Dermatological Surgical History: Reports: None





Social & Family History





- Family History


Family Medical History: No Pertinent Family History





- Caffeine Use


Caffeine Use: Reports: Soda


Caffeine Use Comment: 3 cans daily





ED ROS GENERAL





- Review of Systems


Review Of Systems: Comprehensive ROS is negative, except as noted in HPI.





ED EXAM, HEAD INJURY





- Physical Exam


Exam: See Below


Exam Limited By: No Limitations


General Appearance: Alert, WD/WN, Moderate Distress


Head: Atraumatic, Scalp Tenderness


Nexus Criteria: No: Posterior, Midline Cervical Tenderness, Evidence of 

Intoxication, Altered Level of Consciousness, Focal Neurological Deficit, 

Painful Distraction Injuries


Eyes: Bilateral Eye: EOMI, Normal Inspection, PERRL


Ears: Normal External Exam, Normal Canal, Hearing Grossly Normal, Normal TMs


Nose: Normal Inspection, Normal Mucousa, No Blood


Throat/Mouth: Normal Inspection, Normal Lips, Normal Teeth, Normal Gums, Normal 

Oropharynx, Normal Voice, No Airway Compromise


Neck: Non-Tender, Full Range of Motion, Normal Alignment, Normal Inspection


Respiratory: No Respiratory Distress, Lungs Clear, Normal Breath Sounds, No 

Accessory Muscle Use, Chest Non-Tender


Cardiovascular: Normal Peripheral Pulses, Regular Rate, Rhythm, No Edema, No 

Gallop, No JVD, No Murmur, No Rub


GI/Abdominal Exam: Normal Bowel Sounds, Soft, Non-Tender, No Organomegaly, No 

Distention, No Abnormal Bruit, No Mass


 (Male) Exam: Deferred


Rectal (Males) Exam: Deferred


Extremities: Normal Inspection, Normal Range of Motion, Non-Tender, No Pedal 

Edema, Normal Capillary Refill


Neurologic: CNs II-XII nml As Tested, No Motor/Sensory Deficits, Alert, Normal 

Mood/Affect, Oriented x 3


Skin: Normal Color, Warm/Dry





- [place] Coma Score


Best Eye Response ([place]): (4) Open Spontaneously


Best Verbal Response ([place]): (5) Oriented


Best Motor Response ([place]): (6) Obeys Commands


[place] Total: 15





Course





- Vital Signs


Last Recorded V/S: 


                                Last Vital Signs











Temp  36.6 C   02/22/21 13:44


 


Pulse  64   02/22/21 13:44


 


Resp  20   02/22/21 13:44


 


BP  121/71   02/22/21 13:44


 


Pulse Ox  93 L  02/22/21 13:44














- Orders/Labs/Meds


Meds: 


Medications














Discontinued Medications














Generic Name Dose Route Start Last Admin





  Trade Name Sanchez  PRN Reason Stop Dose Admin


 


Ketorolac Tromethamine  30 mg  02/22/21 15:16  02/22/21 15:27





  Toradol  IVPUSH  02/22/21 15:17  30 mg





  ONETIME ONE   Administration














- Re-Assessments/Exams


Free Text/Narrative Re-Assessment/Exam: 





02/22/21 15:18


The patient was advised of the CT results during the visit. The patient 

continues to report a headache and rates it at a 5/10. 


02/22/21 15:44


The patient reports his headache is better. 





Departure





- Departure


Time of Disposition: 15:44


Disposition: Home, Self-Care 01


Condition: Fair


Clinical Impression: 


 Fall from ground level





Contusion of head


Qualifiers:


 Encounter type: initial encounter Contusion of head detail: scalp Qualified 

Code(s): S00.03XA - Contusion of scalp, initial encounter








- Discharge Information


*PRESCRIPTION DRUG MONITORING PROGRAM REVIEWED*: Not Applicable


*COPY OF PRESCRIPTION DRUG MONITORING REPORT IN PATIENT [facility]: Not Applicable


Forms:  ED Department Discharge


Care Plan Goals: 


The patient was advised of the examination and CT results during the visit. The 

patient was given an IV dose of Toradol for his headache during the visit. The 

patient was encouraged to rest and relax over the next 24 hours. If the patient 

has any additional symptoms or concerns, the patient should either return to the

 emergency department or visit her primary care facility. 





Sepsis Event Note (ED)





- Focused Exam


Vital Signs: 


                                   Vital Signs











  Temp Pulse Resp BP Pulse Ox


 


 02/22/21 13:44  36.6 C  64  20  121/71  93 L

## 2021-02-22 NOTE — CT
EXAMINATION: Cervical Spine wo Cont  SEX: Male   AGE: 60 years

 

CLINICAL HISTORY: 60-year-old 277 pound male injured in ground-level fall

(posterior head pain)

 

Scan technique: Volume acquisition of data emergency unenhanced CT scan of the

cervical spine obtained with the patient lying supine on the Siemens multi slice

scanner [place], [place]. All data archived in

the PACS system for storage, reformatting axial/sagittal/coronal planes and

study.

 

Interpretation:

1. Symmetric clear pneumatization of the mastoid sinuses. No basal skull

fracture.

2. Normal density, height and alignment of the 7 cervical and first 3 thoracic

vertebra.

3. No prevertebral soft tissue swelling, cervical fracture, spondylolisthesis or

jumped locked facet.

4. Relative narrowing of the intervertebral disc spaces C5-6 and C6-7 levels

with marginal spondylosis (disc disease).

5 Lung apices are clear. No cervical rib anomalies. No fractures of first 2

ribs.

 

CONCLUSION: Lower cervical disc disease. No fracture or dislocation cervical

spine.

## 2021-02-22 NOTE — CT
EXAMINATION: Head wo Cont  SEX: Male   AGE: 60 years

 

CLINICAL HISTORY: 60-year-old 277 pound male injured in ground-level fall

(posterior head pain). No previous exams immediately available for comparison).

"Cervical disc disease but no fracture" reported on CT cervical spine.

 

Scan technique: Volume acquisition of data emergency unenhanced CT scan of the

head and brain obtained with the patient lying supine on the Siemens multislice

scanner [place], [place]. All data archived in

the PACS system for storage, reformatting axial/sagittal/coronal planes and

study (bone/brain windows).

 

Interpretation:

1. Uniformly thick bony calvarium without sign of skull fracture,

underlying/contrecoup brain contusion, or abnormal extracerebral/intracranial

epidural or subdural hematoma. Symmetric clear frontal and mastoid sinuses.

2. Tiny cyst adjacent to the right lateral ventricle. Physiologic midline pineal

and symmetric choroid plexus calcifications.

3. No sign of acute intracerebral, intraventricular or subarachnoid bleed.

4. No ischemic strokes/encephalomalacia, microvascular ischemic change, or

arachnoid cyst. No hydrocephalus.

5. Cerebellum and brainstem unremarkable.

 

CONCLUSION: No sign of skull fracture or closed head trauma. Tiny subarachnoid

cyst.